# Patient Record
Sex: MALE | URBAN - METROPOLITAN AREA
[De-identification: names, ages, dates, MRNs, and addresses within clinical notes are randomized per-mention and may not be internally consistent; named-entity substitution may affect disease eponyms.]

---

## 2024-04-12 ENCOUNTER — OFFICE VISIT (OUTPATIENT)
Dept: MEDICAL GROUP | Age: 49
End: 2024-04-12
Payer: COMMERCIAL

## 2024-04-12 VITALS
WEIGHT: 213 LBS | HEART RATE: 65 BPM | BODY MASS INDEX: 25.94 KG/M2 | TEMPERATURE: 97.5 F | HEIGHT: 76 IN | OXYGEN SATURATION: 98 % | DIASTOLIC BLOOD PRESSURE: 68 MMHG | SYSTOLIC BLOOD PRESSURE: 112 MMHG

## 2024-04-12 DIAGNOSIS — F90.0 ATTENTION DEFICIT HYPERACTIVITY DISORDER (ADHD), PREDOMINANTLY INATTENTIVE TYPE: Chronic | ICD-10-CM

## 2024-04-12 DIAGNOSIS — F33.1 MODERATE EPISODE OF RECURRENT MAJOR DEPRESSIVE DISORDER (HCC): Chronic | ICD-10-CM

## 2024-04-12 DIAGNOSIS — J45.990 EXERCISE-INDUCED ASTHMA: Chronic | ICD-10-CM

## 2024-04-12 DIAGNOSIS — B00.1 RECURRENT HERPES LABIALIS: ICD-10-CM

## 2024-04-12 DIAGNOSIS — Z11.59 NEED FOR HEPATITIS C SCREENING TEST: ICD-10-CM

## 2024-04-12 DIAGNOSIS — Z00.00 BLOOD TESTS FOR ROUTINE GENERAL PHYSICAL EXAMINATION: ICD-10-CM

## 2024-04-12 DIAGNOSIS — Z12.11 COLON CANCER SCREENING: ICD-10-CM

## 2024-04-12 DIAGNOSIS — N52.9 ERECTILE DYSFUNCTION, UNSPECIFIED ERECTILE DYSFUNCTION TYPE: ICD-10-CM

## 2024-04-12 PROCEDURE — 3074F SYST BP LT 130 MM HG: CPT | Performed by: STUDENT IN AN ORGANIZED HEALTH CARE EDUCATION/TRAINING PROGRAM

## 2024-04-12 PROCEDURE — 3078F DIAST BP <80 MM HG: CPT | Performed by: STUDENT IN AN ORGANIZED HEALTH CARE EDUCATION/TRAINING PROGRAM

## 2024-04-12 PROCEDURE — 99204 OFFICE O/P NEW MOD 45 MIN: CPT | Performed by: STUDENT IN AN ORGANIZED HEALTH CARE EDUCATION/TRAINING PROGRAM

## 2024-04-12 RX ORDER — ACYCLOVIR 800 MG/1
800 TABLET ORAL
Qty: 60 TABLET | Refills: 2 | Status: SHIPPED | OUTPATIENT
Start: 2024-04-12

## 2024-04-12 RX ORDER — ALBUTEROL SULFATE 90 UG/1
AEROSOL, METERED RESPIRATORY (INHALATION)
COMMUNITY
Start: 2023-05-23 | End: 2025-05-22

## 2024-04-12 RX ORDER — METHYLPHENIDATE HYDROCHLORIDE 18 MG/1
TABLET, EXTENDED RELEASE ORAL
COMMUNITY
Start: 2024-02-21

## 2024-04-12 RX ORDER — TADALAFIL 10 MG/1
TABLET ORAL
COMMUNITY
Start: 2024-02-06 | End: 2026-02-05

## 2024-04-12 RX ORDER — ACYCLOVIR 800 MG/1
800 TABLET ORAL
COMMUNITY
End: 2024-04-12 | Stop reason: SDUPTHER

## 2024-04-12 RX ORDER — DULOXETIN HYDROCHLORIDE 20 MG/1
60 CAPSULE, DELAYED RELEASE ORAL DAILY
COMMUNITY
Start: 2024-02-06 | End: 2026-02-05

## 2024-04-12 RX ORDER — GABAPENTIN 100 MG/1
CAPSULE ORAL
COMMUNITY
Start: 2024-02-20 | End: 2026-02-19

## 2024-04-12 ASSESSMENT — PATIENT HEALTH QUESTIONNAIRE - PHQ9
CLINICAL INTERPRETATION OF PHQ2 SCORE: 1
SUM OF ALL RESPONSES TO PHQ QUESTIONS 1-9: 2
5. POOR APPETITE OR OVEREATING: 0 - NOT AT ALL

## 2024-04-12 ASSESSMENT — ENCOUNTER SYMPTOMS
SHORTNESS OF BREATH: 0
DIZZINESS: 0
BACK PAIN: 0
BLURRED VISION: 0
BLOOD IN STOOL: 0
ORTHOPNEA: 0
CHILLS: 0
HEARTBURN: 0
COUGH: 0
HEADACHES: 0
DEPRESSION: 0
FEVER: 0
DOUBLE VISION: 0
PALPITATIONS: 0
ABDOMINAL PAIN: 0
BRUISES/BLEEDS EASILY: 0
WHEEZING: 0
WEAKNESS: 0

## 2024-04-12 NOTE — PROGRESS NOTES
Subjective:     CC: Establish care, new patient.      HISTORY OF THE PRESENT ILLNESS:   Patient is a 49 y.o. male. This pleasant patient is here today to establish care and discuss immunizations, screening and chronic medical conditions.  Patient has a past medical history of ADHD, depression, insomnia, fibromyalgia, exercise-induced asthma, erectile dysfunction, recurrent herpes labialis, and spinal stenosis of the lumbar spine.  Patient is currently taking methylphenidate, Cialis, gabapentin, Cymbalta, as needed albuterol, and Aciclovir as needed for herpes recurrences.  Patient just moved to [place] from the [facility] last month.  He would also like to get established with psychiatry.  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  Today he had a complaint of dry cracked hands.  This is very likely secondary to frequent handwashing.  I recommended him to make sure he keeps his hands moisturized he can buy over-the-counter continue mineral oil or petrolatum.  I told him that he needs to be using cream/lotion every single time after he washes hands otherwise this condition will not improve and might get worse.    Health Maintenance: Completed    ROS:   Review of Systems   Constitutional:  Negative for chills, fever and malaise/fatigue.   HENT:  Negative for nosebleeds and tinnitus.    Eyes:  Negative for blurred vision and double vision.   Respiratory:  Negative for cough, shortness of breath and wheezing.    Cardiovascular:  Negative for chest pain, palpitations, orthopnea and leg swelling.   Gastrointestinal:  Negative for abdominal pain, blood in stool, heartburn and melena.   Genitourinary:  Negative for dysuria and urgency.   Musculoskeletal:  Negative for back pain and joint pain.   Skin:  Negative for rash.   Neurological:  Negative for dizziness, weakness and headaches.   Endo/Heme/Allergies:  Does not bruise/bleed easily.   Psychiatric/Behavioral:  Negative for depression and suicidal ideas.   "        Objective:     Exam: /68 (BP Location: Right arm, Patient Position: Sitting, BP Cuff Size: Adult)   Pulse 65   Temp 36.4 °C (97.5 °F) (Temporal)   Ht 1.93 m (6' 4\")   Wt 96.6 kg (213 lb)   SpO2 98%  Body mass index is 25.93 kg/m².    Physical Exam  Vitals reviewed.   Constitutional:       General: He is not in acute distress.     Appearance: He is not ill-appearing.   HENT:      Head: Normocephalic and atraumatic.      Right Ear: Tympanic membrane and ear canal normal.      Left Ear: Tympanic membrane and ear canal normal.      Nose: No congestion.      Mouth/Throat:      Mouth: Mucous membranes are moist.      Pharynx: No oropharyngeal exudate or posterior oropharyngeal erythema.   Eyes:      General: No scleral icterus.     Extraocular Movements: Extraocular movements intact.      Comments: Right pupil nonreactive to light (this is his baseline, he had trauma as a child).   Cardiovascular:      Rate and Rhythm: Normal rate and regular rhythm.      Pulses: Normal pulses.      Heart sounds: Normal heart sounds. No murmur heard.  Pulmonary:      Effort: Pulmonary effort is normal. No respiratory distress.      Breath sounds: Normal breath sounds. No wheezing.   Abdominal:      General: Bowel sounds are normal. There is no distension.      Palpations: Abdomen is soft.      Tenderness: There is no abdominal tenderness. There is no guarding or rebound.   Musculoskeletal:      Cervical back: Normal range of motion and neck supple. No rigidity.      Right lower leg: No edema.      Left lower leg: No edema.   Skin:     General: Skin is warm.      Capillary Refill: Capillary refill takes less than 2 seconds.      Findings: No bruising or erythema.   Neurological:      General: No focal deficit present.      Mental Status: He is alert.      Cranial Nerves: No cranial nerve deficit.      Sensory: No sensory deficit.   Psychiatric:         Mood and Affect: Mood normal.         Behavior: Behavior normal. "       Labs: Ordered    Assessment & Plan:   49 y.o. male with the following -    1. Attention deficit hyperactivity disorder (ADHD), predominantly inattentive type  2. Moderate episode of recurrent major depressive disorder (HCC)  -Chronic, stable  -He was seen frequently by psychiatry in [place]  -Currently taking methylphenidate and duloxetine  -States has been on a stable dose for quite some time  -He would like to establish to care with psychiatry  -Denies SI  -Continue same therapy  -Referral placed  - Referral to Psychiatry    3. Recurrent herpes labialis  -Chronic, stable  -Several recurrences per year  -Currently taking acyclovir only with recurrences  -In the past he also took daily doses especially when he has a partner, currently he is single and he is not dating so he has been taking acyclovir only with recurrences.    - acyclovir (ZOVIRAX) 800 MG Tab; Take 1 Tablet by mouth 1 time a day as needed (outbreaks).  Dispense: 60 Tablet; Refill: 2    4. Exercise-induced asthma  -Chronic, stable  -Airway hyperreactivity with exertion  -Uses inhaler prior to activity    5. Erectile dysfunction, unspecified erectile dysfunction type  -Chronic, stable  -Taking Cialis 10 mg  as needed    6. Blood tests for routine general physical examination  -Appropriate lab work for age  - CBC WITH DIFFERENTIAL; Future  - Comp Metabolic Panel; Future  - Lipid Profile; Future  - TSH; Future  - VITAMIN D,25 HYDROXY (DEFICIENCY); Future  - HEMOGLOBIN A1C; Future    7. Need for hepatitis C screening test  -Due for screening  - HEP C VIRUS ANTIBODY; Future    8. Colon cancer screening  -Due for screening  - COLOGUARD (FIT DNA)       Return in about 4 weeks (around 5/10/2024) for Labs.    Please note that this dictation was created using voice recognition software. I have made every reasonable attempt to correct obvious errors, but I expect that there are errors of grammar and possibly content that I did not discover before  finalizing the note.

## 2024-04-12 NOTE — PATIENT INSTRUCTIONS
- Continue home meds  - Schedule appt with Psychiatry  - Send Colloguard kit  - Fasting labs  - Follow up in 4 weeks  - Zoom visit OK

## 2024-05-07 ENCOUNTER — PATIENT MESSAGE (OUTPATIENT)
Dept: MEDICAL GROUP | Age: 49
End: 2024-05-07
Payer: COMMERCIAL

## 2024-05-07 ENCOUNTER — TELEPHONE (OUTPATIENT)
Dept: MEDICAL GROUP | Age: 49
End: 2024-05-07
Payer: COMMERCIAL

## 2024-05-07 DIAGNOSIS — F33.1 MODERATE EPISODE OF RECURRENT MAJOR DEPRESSIVE DISORDER (HCC): ICD-10-CM

## 2024-05-07 DIAGNOSIS — F90.0 ATTENTION DEFICIT HYPERACTIVITY DISORDER (ADHD), PREDOMINANTLY INATTENTIVE TYPE: ICD-10-CM

## 2024-05-07 NOTE — PATIENT COMMUNICATION
Phone Number Called: [phone]    Call outcome: Appointment rescheduled     Message: Called Jason to cancel his Friday appointment due to still don't getting blood work done and preferred an appointment after his results came in to discuss with Dr. Fitzgerald. Rescheduled to May 4 at 9:20 AM.

## 2024-05-07 NOTE — TELEPHONE ENCOUNTER
Patient left a my chart  message regarding needing a referral to his preferred psychiatry office.  Patient has a referral to Psychiatry already placed but has an appointment with his preferred office and needs referral paper work sent over.     Office: [facility]   Dr. Greta Bonilla MD

## 2024-05-08 ENCOUNTER — PATIENT MESSAGE (OUTPATIENT)
Dept: MEDICAL GROUP | Age: 49
End: 2024-05-08
Payer: COMMERCIAL

## 2024-05-08 RX ORDER — TADALAFIL 10 MG/1
5 TABLET ORAL PRN
Qty: 10 TABLET | Refills: 1 | Status: SHIPPED | OUTPATIENT
Start: 2024-05-08 | End: 2024-05-13 | Stop reason: SDUPTHER

## 2024-05-13 ENCOUNTER — PATIENT MESSAGE (OUTPATIENT)
Dept: MEDICAL GROUP | Age: 49
End: 2024-05-13
Payer: COMMERCIAL

## 2024-05-13 DIAGNOSIS — N52.9 ERECTILE DYSFUNCTION, UNSPECIFIED ERECTILE DYSFUNCTION TYPE: ICD-10-CM

## 2024-05-15 ENCOUNTER — PATIENT MESSAGE (OUTPATIENT)
Dept: MEDICAL GROUP | Age: 49
End: 2024-05-15
Payer: COMMERCIAL

## 2024-05-17 RX ORDER — TADALAFIL 10 MG/1
5 TABLET ORAL PRN
Qty: 10 TABLET | Refills: 1 | Status: SHIPPED | OUTPATIENT
Start: 2024-05-17 | End: 2024-05-24

## 2024-05-20 ENCOUNTER — PATIENT MESSAGE (OUTPATIENT)
Dept: MEDICAL GROUP | Age: 49
End: 2024-05-20
Payer: COMMERCIAL

## 2024-05-20 NOTE — PATIENT COMMUNICATION
Phone Number Called: [phone]     Call outcome: Spoke to patient regarding message below.    Message: Spoke to Lavern    Discussed with lavern that his reffereal did go through to  for psychiatry. Although notes on referral stated :      Letter resent to patient      We cannot approve this referral under this patients plan as we are not the PCP they are registered with, patient will have to update PCP .    Therefore discussed with Lavern that he had to call his insurance to update his current pcp  as his current.

## 2024-05-21 ENCOUNTER — TELEPHONE (OUTPATIENT)
Dept: MEDICAL GROUP | Age: 49
End: 2024-05-21
Payer: COMMERCIAL

## 2024-05-21 DIAGNOSIS — F90.0 ATTENTION DEFICIT HYPERACTIVITY DISORDER (ADHD), PREDOMINANTLY INATTENTIVE TYPE: ICD-10-CM

## 2024-05-21 DIAGNOSIS — F33.1 MODERATE EPISODE OF RECURRENT MAJOR DEPRESSIVE DISORDER (HCC): ICD-10-CM

## 2024-05-21 NOTE — TELEPHONE ENCOUNTER
Phone Number Called: [phone]    Call outcome: Spoke to patient regarding message below.    Message: Spoke to Jason, regarding pcp change. Since insurance now has the corrected pcp listed as . Jason needs to be resubmitted to Psychiatry with Dr.Ellen Bonilla.

## 2024-05-24 DIAGNOSIS — N52.9 ERECTILE DYSFUNCTION, UNSPECIFIED ERECTILE DYSFUNCTION TYPE: ICD-10-CM

## 2024-05-24 RX ORDER — TADALAFIL 5 MG/1
5 TABLET ORAL PRN
Qty: 30 TABLET | Refills: 3 | Status: SHIPPED | OUTPATIENT
Start: 2024-05-24

## 2024-06-18 ENCOUNTER — HOSPITAL ENCOUNTER (OUTPATIENT)
Dept: LAB | Facility: MEDICAL CENTER | Age: 49
End: 2024-06-18
Attending: STUDENT IN AN ORGANIZED HEALTH CARE EDUCATION/TRAINING PROGRAM
Payer: COMMERCIAL

## 2024-06-18 DIAGNOSIS — Z00.00 BLOOD TESTS FOR ROUTINE GENERAL PHYSICAL EXAMINATION: ICD-10-CM

## 2024-06-18 DIAGNOSIS — Z11.59 NEED FOR HEPATITIS C SCREENING TEST: ICD-10-CM

## 2024-06-18 LAB
25(OH)D3 SERPL-MCNC: 40 NG/ML (ref 30–100)
ALBUMIN SERPL BCP-MCNC: 4.7 G/DL (ref 3.2–4.9)
ALBUMIN/GLOB SERPL: 1.5 G/DL
ALP SERPL-CCNC: 91 U/L (ref 30–99)
ALT SERPL-CCNC: 21 U/L (ref 2–50)
ANION GAP SERPL CALC-SCNC: 11 MMOL/L (ref 7–16)
AST SERPL-CCNC: 20 U/L (ref 12–45)
BASOPHILS # BLD AUTO: 1.2 % (ref 0–1.8)
BASOPHILS # BLD: 0.06 K/UL (ref 0–0.12)
BILIRUB SERPL-MCNC: 1.1 MG/DL (ref 0.1–1.5)
BUN SERPL-MCNC: 22 MG/DL (ref 8–22)
CALCIUM ALBUM COR SERPL-MCNC: 8.9 MG/DL (ref 8.5–10.5)
CALCIUM SERPL-MCNC: 9.5 MG/DL (ref 8.5–10.5)
CHLORIDE SERPL-SCNC: 102 MMOL/L (ref 96–112)
CHOLEST SERPL-MCNC: 204 MG/DL (ref 100–199)
CO2 SERPL-SCNC: 27 MMOL/L (ref 20–33)
CREAT SERPL-MCNC: 0.83 MG/DL (ref 0.5–1.4)
EOSINOPHIL # BLD AUTO: 0.24 K/UL (ref 0–0.51)
EOSINOPHIL NFR BLD: 4.6 % (ref 0–6.9)
ERYTHROCYTE [DISTWIDTH] IN BLOOD BY AUTOMATED COUNT: 42.5 FL (ref 35.9–50)
EST. AVERAGE GLUCOSE BLD GHB EST-MCNC: 117 MG/DL
GFR SERPLBLD CREATININE-BSD FMLA CKD-EPI: 108 ML/MIN/1.73 M 2
GLOBULIN SER CALC-MCNC: 3.2 G/DL (ref 1.9–3.5)
GLUCOSE SERPL-MCNC: 105 MG/DL (ref 65–99)
HBA1C MFR BLD: 5.7 % (ref 4–5.6)
HCT VFR BLD AUTO: 48.3 % (ref 42–52)
HCV AB SER QL: NORMAL
HDLC SERPL-MCNC: 42 MG/DL
HGB BLD-MCNC: 15.9 G/DL (ref 14–18)
IMM GRANULOCYTES # BLD AUTO: 0.01 K/UL (ref 0–0.11)
IMM GRANULOCYTES NFR BLD AUTO: 0.2 % (ref 0–0.9)
LDLC SERPL CALC-MCNC: 137 MG/DL
LYMPHOCYTES # BLD AUTO: 2.02 K/UL (ref 1–4.8)
LYMPHOCYTES NFR BLD: 39.1 % (ref 22–41)
MCH RBC QN AUTO: 30.2 PG (ref 27–33)
MCHC RBC AUTO-ENTMCNC: 32.9 G/DL (ref 32.3–36.5)
MCV RBC AUTO: 91.7 FL (ref 81.4–97.8)
MONOCYTES # BLD AUTO: 0.38 K/UL (ref 0–0.85)
MONOCYTES NFR BLD AUTO: 7.4 % (ref 0–13.4)
NEUTROPHILS # BLD AUTO: 2.46 K/UL (ref 1.82–7.42)
NEUTROPHILS NFR BLD: 47.5 % (ref 44–72)
NRBC # BLD AUTO: 0 K/UL
NRBC BLD-RTO: 0 /100 WBC (ref 0–0.2)
PLATELET # BLD AUTO: 224 K/UL (ref 164–446)
PMV BLD AUTO: 9.6 FL (ref 9–12.9)
POTASSIUM SERPL-SCNC: 4.4 MMOL/L (ref 3.6–5.5)
PROT SERPL-MCNC: 7.9 G/DL (ref 6–8.2)
RBC # BLD AUTO: 5.27 M/UL (ref 4.7–6.1)
SODIUM SERPL-SCNC: 140 MMOL/L (ref 135–145)
TRIGL SERPL-MCNC: 123 MG/DL (ref 0–149)
TSH SERPL DL<=0.005 MIU/L-ACNC: 2.5 UIU/ML (ref 0.38–5.33)
WBC # BLD AUTO: 5.2 K/UL (ref 4.8–10.8)

## 2024-06-18 PROCEDURE — 84443 ASSAY THYROID STIM HORMONE: CPT

## 2024-06-18 PROCEDURE — 83036 HEMOGLOBIN GLYCOSYLATED A1C: CPT

## 2024-06-18 PROCEDURE — 36415 COLL VENOUS BLD VENIPUNCTURE: CPT

## 2024-06-18 PROCEDURE — 85025 COMPLETE CBC W/AUTO DIFF WBC: CPT

## 2024-06-18 PROCEDURE — 86803 HEPATITIS C AB TEST: CPT

## 2024-06-18 PROCEDURE — 80061 LIPID PANEL: CPT

## 2024-06-18 PROCEDURE — 82306 VITAMIN D 25 HYDROXY: CPT

## 2024-06-18 PROCEDURE — 80053 COMPREHEN METABOLIC PANEL: CPT

## 2024-06-24 ENCOUNTER — APPOINTMENT (OUTPATIENT)
Dept: MEDICAL GROUP | Age: 49
End: 2024-06-24
Payer: COMMERCIAL

## 2024-06-24 VITALS — WEIGHT: 200 LBS | BODY MASS INDEX: 24.34 KG/M2

## 2024-06-24 DIAGNOSIS — F33.1 MODERATE EPISODE OF RECURRENT MAJOR DEPRESSIVE DISORDER (HCC): Chronic | ICD-10-CM

## 2024-06-24 DIAGNOSIS — N48.89 PENILE PAIN, CHRONIC: ICD-10-CM

## 2024-06-24 DIAGNOSIS — R73.03 PREDIABETES: ICD-10-CM

## 2024-06-24 DIAGNOSIS — G89.29 PENILE PAIN, CHRONIC: ICD-10-CM

## 2024-06-24 DIAGNOSIS — F90.0 ATTENTION DEFICIT HYPERACTIVITY DISORDER (ADHD), PREDOMINANTLY INATTENTIVE TYPE: Chronic | ICD-10-CM

## 2024-06-24 DIAGNOSIS — E78.2 MIXED DYSLIPIDEMIA: ICD-10-CM

## 2024-06-24 PROBLEM — E78.00 ELEVATED LDL CHOLESTEROL LEVEL: Status: ACTIVE | Noted: 2024-06-24

## 2024-06-24 PROCEDURE — 99214 OFFICE O/P EST MOD 30 MIN: CPT | Performed by: STUDENT IN AN ORGANIZED HEALTH CARE EDUCATION/TRAINING PROGRAM

## 2024-06-24 RX ORDER — METHYLPHENIDATE HYDROCHLORIDE 18 MG/1
18 TABLET ORAL EVERY MORNING
COMMUNITY
Start: 2024-05-28

## 2024-06-24 ASSESSMENT — ENCOUNTER SYMPTOMS
ABDOMINAL PAIN: 0
SHORTNESS OF BREATH: 0
BRUISES/BLEEDS EASILY: 0
BACK PAIN: 0
HEADACHES: 0
DEPRESSION: 0
BLOOD IN STOOL: 0
WEAKNESS: 0
BLURRED VISION: 0
HEARTBURN: 0
DIZZINESS: 0
CHILLS: 0
DOUBLE VISION: 0
PALPITATIONS: 0
FEVER: 0

## 2024-06-24 ASSESSMENT — FIBROSIS 4 INDEX: FIB4 SCORE: 0.92

## 2024-06-24 NOTE — PATIENT INSTRUCTIONS
-Continue home medications  -Recommended against testosterone therapy without diagnosis of hypogonadism  -Regular exercise, DASH diet  -We will repeat labs again next year  -Please follow-up on referral to urologist

## 2024-06-24 NOTE — PROGRESS NOTES
Virtual Visit: Established Patient   This visit was conducted via Zoom using secure and encrypted videoconferencing technology.   The patient was in their home in the [facility].    The patient's identity was confirmed and verbal consent was obtained for this virtual visit.    Subjective:   CC: Lab review  Chief Complaint   Patient presents with    Lab Results     History of Present Illness  The patient is a 49-year-old male who is presenting for a follow-up consultation for lab review.    The patient recently reviewed his laboratory results, expressing a preference for his lipid profile, despite all other parameters being within normal limits. He acknowledges the necessity of dietary modifications, noting that he consumes full-fat milk and yogurt, albeit less frequently due to perceived satiation.    The patient expresses interest in hormone replacement therapy, citing a lack of motivation to resume his gym activities post-COVID-19 pandemic. He reports experiencing mild penile pain during masturbation and sexual intercourse, which he describes as superficial, located adjacent to the glans. He denies any penile soreness, or discharge and mentions that his partner, who recently underwent a hysterectomy, experiences dryness and internal tightness, which he attributes to excessive friction. He and his partner have been utilizing lubrication for symptom relief.  He would like to obtain a referral to see a urologist.  Routine labs show the following: Normal CBC, CMP showed mild elevated fasting blood glucose, A1c was 5.7%, vitamin D was normal, TSH was 2.5, with rest antibody was nonreactive, lipid profile showed mild elevated total cholesterol 204, elevated , but normal triglycerides and HDL.      ROS   Review of Systems   Constitutional:  Negative for chills, fever and malaise/fatigue.   HENT:  Negative for nosebleeds and tinnitus.    Eyes:  Negative for blurred vision and double vision.   Respiratory:   Negative for shortness of breath.    Cardiovascular:  Negative for chest pain and palpitations.   Gastrointestinal:  Negative for abdominal pain, blood in stool, heartburn and melena.   Genitourinary:  Negative for dysuria, frequency, hematuria and urgency.   Musculoskeletal:  Negative for back pain and joint pain.   Skin:  Negative for rash.   Neurological:  Negative for dizziness, weakness and headaches.   Endo/Heme/Allergies:  Does not bruise/bleed easily.   Psychiatric/Behavioral:  Negative for depression and suicidal ideas.         Current medicines (including changes today)  Current Outpatient Medications   Medication Sig Dispense Refill    methylphenidate (CONCERTA) 18 MG CR tablet Take 18 mg by mouth every morning.      tadalafil (CIALIS) 5 MG tablet Take 1 Tablet by mouth as needed for Erectile Dysfunction. 30 Tablet 3    albuterol 108 (90 Base) MCG/ACT Aero Soln inhalation aerosol Inhale 2 puffs by mouth one-half hour before sports or exercise to prevent cough or wheezing. May repeat every 4 hours as needed for cough or wheezing. 100 days supply is 1 canister      DULoxetine (CYMBALTA) 20 MG Cap DR Particles Take 60 mg by mouth every day.      gabapentin (NEURONTIN) 100 MG Cap Take 1 to 3 capsules by mouth at bedtime as needed for insomnia      Methylphenidate HCl ER 18 MG TABLET SR 24 HR Take 1 to 2 tablets orally daily in the morning      acyclovir (ZOVIRAX) 800 MG Tab Take 1 Tablet by mouth 1 time a day as needed (outbreaks). 60 Tablet 2     No current facility-administered medications for this visit.       Patient Active Problem List    Diagnosis Date Noted    Elevated LDL cholesterol level 06/24/2024    Prediabetes 06/24/2024    Recurrent herpes labialis 04/12/2024    Exercise-induced asthma 05/25/2022    Erectile dysfunction 03/19/2018    Attention deficit hyperactivity disorder (ADHD), predominantly inattentive type 07/09/2015    Major depressive disorder, recurrent episode (HCC) 08/11/2014     Insomnia 04/28/2014    Spinal stenosis of lumbar region 04/15/2008    Fibromyalgia 04/24/2007        Objective:   Wt 90.7 kg (200 lb) Comment: pt states  BMI 24.34 kg/m²     Physical Exam:  Unable to perform physical exam since this was a virtual visit  Constitutional: Alert, no distress, well-groomed.  Skin: No rashes in visible areas.  Psych: Alert and oriented x3, normal affect and mood.     Assessment and Plan:   The following treatment plan was discussed:     1. Attention deficit hyperactivity disorder (ADHD), predominantly inattentive type  -Chronic, stable  -Currently taking methylphenidate  -He is following with psychiatry  -Continue same therapy    2. Moderate episode of recurrent major depressive disorder (HCC)  -Chronic, stable  -No significant depressive symptoms  -Currently taking Cymbalta 60 mg daily  -Continue same therapy    3.  Mixed dyslipidemia  -Routine labs showed elevated LDL and total cholesterol, but normal triglycerides and HDL  -ASCVD score is 2.9%  -Therapy with statin is not recommended  -I encouraged the patient to eat healthy and such as a plant-based diet, exercise regularly and maintain a healthy weight  -Will repeat labs again next year    4. Prediabetes  -Routine labs show mild elevated A1c 5.7%  -Encourage weight loss and regular exercise    5. Penile pain, chronic  -Ongoing for years  -Patient stated superficial pain close to the glans of the penis  -Patient states that this has been a recurrent symptom for years, recently exacerbated with masturbation and after starting a new relationship with a new sexual partner  -Patient stated the pain feels very superficial.  The pain is not located in the urethra, patient denies any discharge or rashes.  He also states that his new sexual partner is asymptomatic.  -He would like to see a urologist for evaluation  -Referral placed      Follow-up: Return if symptoms worsen or fail to improve.

## 2024-06-28 ENCOUNTER — OFFICE VISIT (OUTPATIENT)
Dept: UROLOGY | Facility: MEDICAL CENTER | Age: 49
End: 2024-06-28
Payer: COMMERCIAL

## 2024-06-28 VITALS
HEART RATE: 64 BPM | SYSTOLIC BLOOD PRESSURE: 123 MMHG | WEIGHT: 205 LBS | BODY MASS INDEX: 24.96 KG/M2 | DIASTOLIC BLOOD PRESSURE: 74 MMHG | TEMPERATURE: 98.4 F | OXYGEN SATURATION: 99 % | HEIGHT: 76 IN

## 2024-06-28 DIAGNOSIS — N48.89 PENILE PAIN: ICD-10-CM

## 2024-06-28 RX ORDER — BETAMETHASONE DIPROPIONATE 0.5 MG/G
CREAM TOPICAL
Qty: 15 G | Refills: 0 | Status: SHIPPED | OUTPATIENT
Start: 2024-06-28

## 2024-06-28 ASSESSMENT — FIBROSIS 4 INDEX: FIB4 SCORE: 0.92

## 2024-06-29 PROBLEM — N48.89 PENILE PAIN: Status: ACTIVE | Noted: 2024-06-29

## 2024-06-29 NOTE — PROGRESS NOTES
Subjective  Clayton Pedroza is a 49 y.o. male who presents today for evaluation of penile pain.    He states it began years ago when he was masturbating frequently and he believes he may have injured the skin. He notes the pain on one specific portion of the penile shaft just inferior to the glans on the dorsal aspect. It only bothers him when he is sexually active with his current partner. At rest no pain, no obvious skin changes or nodules. He does feel that his skin is stretched quite tight during an erection.    He denies penile curvature, no bothersome urinary symptoms. He did note temporary improvement when he took daily anti-inflammatory medications for two months for a spinal issue.    Family History   Problem Relation Age of Onset    No Known Problems Mother     Hyperlipidemia Father     Heart Disease Father     Esophageal Cancer Father        Social History     Socioeconomic History    Marital status: Single     Spouse name: Not on file    Number of children: Not on file    Years of education: Not on file    Highest education level: Not on file   Occupational History    Not on file   Tobacco Use    Smoking status: Never    Smokeless tobacco: Never   Vaping Use    Vaping status: Never Used   Substance and Sexual Activity    Alcohol use: Not on file    Drug use: Never    Sexual activity: Not Currently     Partners: Female     Birth control/protection: Condom   Other Topics Concern    Not on file   Social History Narrative    Not on file     Social Determinants of Health     Financial Resource Strain: Not on file   Food Insecurity: Not on file   Transportation Needs: Not on file   Physical Activity: Not on file   Stress: Not on file   Social Connections: Not on file   Intimate Partner Violence: Not on file   Housing Stability: Not on file       No past surgical history on file.    No past medical history on file.    Current Outpatient Medications   Medication Sig Dispense Refill    Aug Betamethasone  "Dipropionate (DIPROLENE-AF) 0.05 % Cream Apply to painful area twice daily for 2 weeks 15 g 0    Lidocaine 0.5 % Gel Apply to sensitive area 20 minutes before sexual activity, wipe off after 20 minutes 170 g 0    methylphenidate (CONCERTA) 18 MG CR tablet Take 18 mg by mouth every morning.      tadalafil (CIALIS) 5 MG tablet Take 1 Tablet by mouth as needed for Erectile Dysfunction. 30 Tablet 3    albuterol 108 (90 Base) MCG/ACT Aero Soln inhalation aerosol Inhale 2 puffs by mouth one-half hour before sports or exercise to prevent cough or wheezing. May repeat every 4 hours as needed for cough or wheezing. 100 days supply is 1 canister      DULoxetine (CYMBALTA) 20 MG Cap DR Particles Take 60 mg by mouth every day.      gabapentin (NEURONTIN) 100 MG Cap Take 1 to 3 capsules by mouth at bedtime as needed for insomnia      Methylphenidate HCl ER 18 MG TABLET SR 24 HR Take 1 to 2 tablets orally daily in the morning      acyclovir (ZOVIRAX) 800 MG Tab Take 1 Tablet by mouth 1 time a day as needed (outbreaks). 60 Tablet 2     No current facility-administered medications for this visit.       Allergies   Allergen Reactions    Fluoxetine      torticollis       Objective  /74 (BP Location: Left arm, Patient Position: Sitting, BP Cuff Size: Adult)   Pulse 64   Temp 36.9 °C (98.4 °F) (Temporal)   Ht 1.93 m (6' 4\")   Wt 93 kg (205 lb)   SpO2 99%   Physical Exam  Constitutional:       Appearance: Normal appearance.   HENT:      Head: Normocephalic and atraumatic.   Eyes:      Extraocular Movements: Extraocular movements intact.      Conjunctiva/sclera: Conjunctivae normal.   Pulmonary:      Effort: No respiratory distress.   Genitourinary:     Comments: Pale appearance of penile shaft just inferior to glans, no lesions or masses. Non-tender  Musculoskeletal:      Cervical back: Normal range of motion.   Skin:     General: Skin is warm and dry.   Neurological:      General: No focal deficit present.      Mental " Status: He is alert.   Psychiatric:         Mood and Affect: Mood normal.         Labs:   CBC   Lab Results   Component Value Date/Time    WBC 5.2 06/18/2024 0808    RBC 5.27 06/18/2024 0808    HEMOGLOBIN 15.9 06/18/2024 0808    HEMATOCRIT 48.3 06/18/2024 0808    MCV 91.7 06/18/2024 0808    MCH 30.2 06/18/2024 0808    MCHC 32.9 06/18/2024 0808    RDW 42.5 06/18/2024 0808    MPV 9.6 06/18/2024 0808    LYMPHOCYTES 39.10 06/18/2024 0808    LYMPHS 2.02 06/18/2024 0808    MONOCYTES 7.40 06/18/2024 0808    MONOS 0.38 06/18/2024 0808    EOSINOPHILS 4.60 06/18/2024 0808    EOS 0.24 06/18/2024 0808    BASOPHILS 1.20 06/18/2024 0808    BASO 0.06 06/18/2024 0808    NRBC 0.00 06/18/2024 0808       BMP   Lab Results   Component Value Date/Time    SODIUM 140 06/18/2024 0808    POTASSIUM 4.4 06/18/2024 0808    CHLORIDE 102 06/18/2024 0808    CO2 27 06/18/2024 0808    GLUCOSE 105 (H) 06/18/2024 0808    BUN 22 06/18/2024 0808    CREATININE 0.83 06/18/2024 0808    CALCIUM 9.5 06/18/2024 0808           Imaging:     none    Assessment    Penile Pain  -We discussed there may be sensitization of the superficial nerves causing his paresthesias during sexual activity. We can start with applying a steroid cream twice daily x 2 weeks along with application of lidocaine gel for 20 minutes prior to sexual activity  -If we do not have success with that regimen we could consider a trial of penile blocks similar to what is done with cord blocks for testicular pain vs oral medications. We discussed oral medications can be sedating and he would like to avoid this  -We discussed possible surgical intervention, but that would be a last resort  -Recommendation for a sex therapist provided as she may be able to help the most    Plan    Problem List Items Addressed This Visit       Penile pain